# Patient Record
Sex: FEMALE | Race: WHITE | Employment: OTHER | URBAN - METROPOLITAN AREA
[De-identification: names, ages, dates, MRNs, and addresses within clinical notes are randomized per-mention and may not be internally consistent; named-entity substitution may affect disease eponyms.]

---

## 2018-01-11 ENCOUNTER — HOSPITAL ENCOUNTER (OUTPATIENT)
Dept: PHYSICAL THERAPY | Age: 63
Discharge: HOME | End: 2018-01-11
Payer: COMMERCIAL

## 2018-01-11 PROCEDURE — 97530 THERAPEUTIC ACTIVITIES: CPT

## 2018-01-11 PROCEDURE — 97162 PT EVAL MOD COMPLEX 30 MIN: CPT

## 2018-01-11 NOTE — PROGRESS NOTES
PT DAILY TREATMENT NOTE 3-16    Patient Name: Lou Lim  Date:2018  : 1955  [x]  Patient  Verified  Payor: BLUE CROSS / Plan: Ant Bills [id number] PPO / Product Type: PPO /    In time:11:15 am  Out time:12:07 pm   Total Treatment Time (min): 52  Visit #: 1 of 12    Treatment Area: Left shoulder pain [M25.512]  Left elbow pain [M25.522]    SUBJECTIVE  Pain Level (0-10 scale): 3  Any medication changes, allergies to medications, adverse drug reactions, diagnosis change, or new procedure performed?: [x] No    [] Yes (see summary sheet for update)  Subjective functional status/changes:   [] No changes reported    Hx Present Illness: C/C of Left shoulder and elbow pain   Onset in spring of 2017 - walking dog and dog got excited and pulled leash in left arm - per pt \"he pulled so hard he broke the leash. \"  Pt declined to go to MD - was careful with use  2017 slipped and fell on the left shoulder  Went to PCP and referred to orthopedic surgeon  MRI reveled per pt \"inflammation, a small tear in the cuff and some mild arthritis in the neck. \"  per pt \"he said I was in between stages he could do surgery but I could also try physical therapy. \"  Increase in pain and difficulty with lying on the left side, reaching over head, reaching (as with hugging), pushing (as with standing up and pushing an object), pulling, lifting, dressing, bathing  Pt reports that has gradually tried to increase AROM  Right hand dominant     Pain:  _9__/10 max       __2_/10 min     _3___/10 now    Location: indicates anterior elbow and upper arm and biceps     [] Sharp    [] Dull      [] Burning     []  Aching     [x] Throbbing      [] Tingling     [] Other:       [x]  Constant                   [] Intermittent        Previous treatment:   Cortisone injection 1/10/18    PMHX: PMHx/Surgical Hx:  please see past medical hx form     Social/Recreation/Work: Work Hx: retired  Living Situation: lives with , between [place] near [facility]: caring for farm, scrap booking, gardening     Patient Goal(s): \"No pain and get back to wear I was. \"    Barriers: []pain []financial []time []transportation []other  Motivation: high  Substance use: []Alcohol []Tobacco []other:   FABQ Score: []low [x]elevate  Cognition: A & O x 4  Other    OBJECTIVE    30 min [x]Eval                  []Re-Eval              With   [] TE   [x] TA- 22 min   [] neuro   [] other: Patient Education: [x] Review HEP    [] Progressed/Changed HEP based on:   [] positioning   [] body mechanics   [] transfers   [] heat/ice application    [x] other: pt education regarding exam findings, anatomy involved POC  Activity modifications for dressing and bathing      Other Objective/Functional Measures: Movement/gait:      Visual Inspection: Thoracic: flattened  Lumbar: increased  Shoulder/Scapula: noted tipping and tilting at right scapula    Palpation: TTP at left RC musculature - greatest in infraspinatus  Active trigger points in infraspinatus and supraspinatus   Mild decrease in GHJ mobility   Decreased rib mobility                              AROM/PROM Right Left   Shoulder Flex *   Abd WFL 86*   IR T8-7 Buttock  In scap plane: 63   ER T2 Occiput  In scap plane: 50               Strength Right Left   Shoulder Flex 4+ p! Abd 4+ p! Scaption 4+ p! IR 4+ 4+   ER 4+ p! Pronation 5 5   Supination  5 5            Special Tests Right Left   Full Can/empty Can - +   Neer - +   HK - -   Crossover - +               Other Right Left                                        Other Comments: Standing Forward Flexion distal 1/3 of tibia  Scapular Rhythm: dyskinesia bilaterally   Challenged with engaging serratus     Pain Level (0-10 scale) post treatment: 3    ASSESSMENT/Changes in Function:   Pt is a 60 y.o.  Right hand dominant female with C/C of Left shoulder pain, onset in Spring of 2017 following walking her dog and having dog break the leash while walking. Exacerbation in November with slip and fall landing on left shoulder. Signs/Symptoms consistent with RC pathology and secondary impingement. Objective Findings include decreased ROM, pain with MMT of left shoulder, active trigger points in RC musculature and scapular dyskinesia. Patient will continue to benefit from skilled PT services to modify and progress therapeutic interventions, address functional mobility deficits, address ROM deficits, address strength deficits, analyze and address soft tissue restrictions, analyze and cue movement patterns, analyze and modify body mechanics/ergonomics, assess and modify postural abnormalities and instruct in home and community integration to attain remaining goals. [x]  See Plan of Care  []  See progress note/recertification  []  See Discharge Summary         Progress towards goals / Updated goals:  Short Term Goals: STG- To be accomplished in 2 week(s):  1. Pt will be independent with HEP to encourage prophylaxis. Eval:dispensed  Current: NA    Long Term Goals: LTG- To be accomplished in 6 week(s):  1. Pt will able to dress and bathe without pain,including reaching behind back with Left UE. Eval:  AROM/PROM Right Left   IR T8-7 Buttock  In scap plane: 63   ER T2 Occiput  In scap plane: 50   Current: NA    2. Pt will be able to reach overhead without pain to increase tolerance to daily activities. Eval:  AROM/PROM Right Left   Shoulder Flex *   Abd WFL 86*   Current: NA    3. Pt be able to lift bag of groceries and gallon of milk without pain. Eval:pain with all MMT of left shoulder  Current: NA    4. Pt FOTO score will increase by 14 points to show improvement in function.   Eval:55  Current: will address at visit 5      PLAN  [x]  Upgrade activities as tolerated     []  Continue plan of care  []  Update interventions per flow sheet       []  Discharge due to:_  []  Other:_      Gonzalez Guido, PT, DPT 1/11/2018  11:17 AM    No future appointments.

## 2018-01-11 NOTE — PROGRESS NOTES
[facility] at the [address], [place], [address] [facility]  Phone: [phone]      Fax:  [phone]       Plan of Care/ Statement of Necessity for Physical Therapy Services      Patient name: Merly Lopez Start of Care: 2018   Referral source: Shaunna Reeder MD : 1955    Medical Diagnosis: Left shoulder pain [M25.512]  Left elbow pain [M25.522]   Onset Date:Spring 2017    Treatment Diagnosis: Left Shoulder Pain    Prior Hospitalization: see medical history Provider#: [id number]   Medications: Verified on Patient summary List    Comorbidities: BMI >30, HTN, Hx of Cancer   Prior Level of Function: Increase in pain and difficulty with lying on the left side, reaching over head, reaching (as with hugging), pushing (as with standing up and pushing an object), pulling, lifting, dressing, bathing      The Plan of Care and following information is based on the information from the initial evaluation. Assessment/ key information:   Pt is a 60 y.o. Right hand dominant female with C/C of Left shoulder pain, onset in Spring of 2017 following walking her dog and having dog break the leash while walking. Exacerbation in November with slip and fall landing on left shoulder. Signs/Symptoms consistent with RC pathology and secondary impingement. Objective Findings include decreased ROM, pain with MMT of left shoulder, active trigger points in RC musculature and scapular dyskinesia. Evaluation Complexity History HIGH Complexity :3+ comorbidities / personal factors will impact the outcome/ POC ; Examination HIGH Complexity : 4+ Standardized tests and measures addressing body structure, function, activity limitation and / or participation in recreation  ;Presentation MEDIUM Complexity : Evolving with changing characteristics  ; Clinical Decision Making MEDIUM Complexity : FOTO score of 26-74  Overall Complexity Rating: MEDIUM  Problem List: pain affecting function, decrease ROM, decrease strength, impaired gait/ balance, decrease ADL/ functional abilitiies, decrease activity tolerance and decrease flexibility/ joint mobility   Treatment Plan may include any combination of the following: Therapeutic exercise, Therapeutic activities, Neuromuscular re-education, Physical agent/modality, Gait/balance training and Manual therapy  Patient / Family readiness to learn indicated by: asking questions, trying to perform skills and interest  Persons(s) to be included in education: patient (P)  Barriers to Learning/Limitations: None  Patient Goal (s): No pain and get back to wear I was  Patient Self Reported Health Status: excellent  Rehabilitation Potential: good    Short Term Goals: STG- To be accomplished in 2 week(s):  1. Pt will be independent with HEP to encourage prophylaxis. Eval:dispensed  Current: NA     Long Term Goals: LTG- To be accomplished in 6 week(s):  1. Pt will able to dress and bathe without pain,including reaching behind back with Left UE. Eval:  AROM/PROM Right Left   IR T8-7 Buttock  In scap plane: 63   ER T2 Occiput  In scap plane: 50   Current: NA     2.  Pt will be able to reach overhead without pain to increase tolerance to daily activities. Eval:  AROM/PROM Right Left   Shoulder Flex *   Abd WFL 86*   Current: NA     3.  Pt be able to lift bag of groceries and gallon of milk without pain. Eval:pain with all MMT of left shoulder  Current: NA     4. Pt FOTO score will increase by 14 points to show improvement in function. Eval:55  Current: will address at visit 5     Frequency / Duration: Patient to be seen 2 times per week for 6 weeks.     Patient/ Caregiver education and instruction: Diagnosis, prognosis, self care, activity modification and exercises   [x]  Plan of care has been reviewed with ASHLY Gregory PT, DPT 1/11/2018 1:01 PM  _____________________________________________________________________  I certify that the above Therapy Services are being furnished while the patient is under my care. I agree with the treatment plan and certify that this therapy is necessary.     Physician's Signature:____________________  Date:__________Time:______    Please sign and return to [facility] at the [address], [facility], [address] [facility]       Phone: [phone]      Fax:  [phone]

## 2018-01-16 ENCOUNTER — HOSPITAL ENCOUNTER (OUTPATIENT)
Dept: PHYSICAL THERAPY | Age: 63
Discharge: HOME | End: 2018-01-16
Payer: COMMERCIAL

## 2018-01-16 PROCEDURE — 97110 THERAPEUTIC EXERCISES: CPT

## 2018-01-16 PROCEDURE — 97140 MANUAL THERAPY 1/> REGIONS: CPT

## 2018-01-16 NOTE — PROGRESS NOTES
PT DAILY TREATMENT NOTE     Patient Name: Enrico Granger  Date:2018  : 1955  [x]  Patient  Verified  Payor: BLUE CROSS / Plan: Ant Bills [id number] PPO / Product Type: PPO /    In time:10:27 am  Out time:11:39 am  Total Treatment Time (min): 68 (waiting on PT)  Visit #: 2 of 12    Treatment Area: Left shoulder pain [M25.512]  Left elbow pain [M25.522]    SUBJECTIVE  Pain Level (0-10 scale): 0-3  Any medication changes, allergies to medications, adverse drug reactions, diagnosis change, or new procedure performed?: [x] No    [] Yes (see summary sheet for update)  Subjective functional status/changes:   [] No changes reported  \"It is not that bad, it is getting there. \"    OBJECTIVE    Modality rationale:    Min Type Additional Details    [] Estim:  []Unatt       []IFC  []Premod                        []Other:  []w/ice   []w/heat  Position:  Location:    [] Estim: []Att    []TENS instruct  []NMES                    []Other:  []w/US   []w/ice   []w/heat  Position:  Location:    []  Traction: [] Cervical       []Lumbar                       [] Prone          []Supine                       []Intermittent   []Continuous Lbs:  [] before manual  [] after manual    []  Ultrasound: []Continuous   [] Pulsed                           []1MHz   []3MHz W/cm2:  Location:    []  Iontophoresis with dexamethasone         Location: [] Take home patch   [] In clinic    []  Ice     []  heat  []  Ice massage  []  Laser   []  Anodyne Position:  Location:    []  Laser with stim  []  Other:  Position:  Location:    []  Vasopneumatic Device Pressure:       [] lo [] med [] hi   Temperature: [] lo [] med [] hi   [] Skin assessment post-treatment:  []intact []redness- no adverse reaction    []redness  adverse reaction:     53 min Therapeutic Exercise:  [x] See flow sheet :   Rationale: increase ROM, increase strength, improve coordination and increase proprioception to improve the patients ability to perform daily activities with decreased pain and symptom levels    15 min Manual Therapy:  Inferior and posterior GHJ mobs grade III  PROM with Distraction into flexion and abduction  Contract relax stretching of ER  STM to biceps and infraspinatus    Rationale: decrease pain, increase ROM, increase tissue extensibility, decrease trigger points and increase postural awareness to perform daily activities with decreased pain and symptom levels          With   [] TE   [] TA   [] neuro   [] other: Patient Education: [x] Review HEP    [] Progressed/Changed HEP based on:   [] positioning   [] body mechanics   [] transfers   [] heat/ice application    [] other:      Other Objective/Functional Measures:   TTP and active trigger points in left infraspinatus. Pain with ER     Pain Level (0-10 scale) post treatment: 2    ASSESSMENT/Changes in Function:   Pt reported feeling a \"catch\" with supine flexion, increased pain with lowering. Advised pt to relax left UE expect increased muscle guarding. Patient will continue to benefit from skilled PT services to modify and progress therapeutic interventions, address functional mobility deficits, address ROM deficits, address strength deficits, analyze and address soft tissue restrictions, analyze and cue movement patterns, analyze and modify body mechanics/ergonomics, assess and modify postural abnormalities and instruct in home and community integration to attain remaining goals. []  See Plan of Care  []  See progress note/recertification  []  See Discharge Summary         Progress towards goals / Updated goals:  Short Term Goals: STG- To be accomplished in 2 week(s):  1.  Pt will be independent with HEP to encourage prophylaxis. Eval:dispensed  Current: reports compliance      Long Term Goals: LTG- To be accomplished in 6 week(s):  1.  Pt will able to dress and bathe without pain,including reaching behind back with Left UE.   Eval:  AROM/PROM Right Left   IR T8-7 Buttock  In scap plane: 63   ER T2 Occiput  In scap plane: 50   Current: NA      2.  Pt will be able to reach overhead without pain to increase tolerance to daily activities. Eval:  AROM/PROM Right Left   Shoulder Flex *   Abd WFL 86*   Current: NA      3.  Pt be able to lift bag of groceries and gallon of milk without pain. Eval:pain with all MMT of left shoulder  Current: NA      4.  Pt FOTO score will increase by 14 points to show improvement in function.   Eval:55  Current: will address at visit 5      PLAN  [x]  Upgrade activities as tolerated     []  Continue plan of care  []  Update interventions per flow sheet       []  Discharge due to:_  []  Other:_      Dearl Milks, PT, DPT 1/16/2018  10:33 AM    Future Appointments  Date Time Provider [place]   1/18/2018 2:00 PM Dearl Milks, PT, DPT [facility]   1/23/2018 10:00 AM Dearl Milks, PT, DPT [facility]   1/25/2018 10:00 AM Dearl Milks, PT, DPT [facility]   1/30/2018 3:00 PM Franklin Owens, PTA [facility]   2/1/2018 10:30 AM Dearl Milks, PT, DPT [facility]   2/6/2018 10:00 AM Dearl Milks, PT, DPT [facility]   2/8/2018 10:00 AM Dearl Milks, PT, DPT [facility]   2/13/2018 10:00 AM Dearl Milks, PT, DPT [facility]   2/15/2018 10:00 AM Dearl Milks, PT, DPT [facility]

## 2018-01-18 ENCOUNTER — APPOINTMENT (OUTPATIENT)
Dept: PHYSICAL THERAPY | Age: 63
End: 2018-01-18
Payer: COMMERCIAL

## 2018-01-23 ENCOUNTER — HOSPITAL ENCOUNTER (OUTPATIENT)
Dept: PHYSICAL THERAPY | Age: 63
Discharge: HOME | End: 2018-01-23
Payer: COMMERCIAL

## 2018-01-23 PROCEDURE — 97140 MANUAL THERAPY 1/> REGIONS: CPT

## 2018-01-23 PROCEDURE — 97110 THERAPEUTIC EXERCISES: CPT

## 2018-01-23 NOTE — PROGRESS NOTES
PT DAILY TREATMENT NOTE     Patient Name: Luh Rios  Date:2018  : 1955  [x]  Patient  Verified  Payor: BLUE GARCIA / Plan: Ant Bills [id number] PPO / Product Type: PPO /    In time:10:00 am  Out time:11:15 am  Total Treatment Time (min): 75  Visit #: 3 of 12    Treatment Area: Left shoulder pain [M25.512]  Left elbow pain [M25.522]    SUBJECTIVE  Pain Level (0-10 scale): 0  Any medication changes, allergies to medications, adverse drug reactions, diagnosis change, or new procedure performed?: [x] No    [] Yes (see summary sheet for update)  Subjective functional status/changes:   [] No changes reported  \"A lot better. \"  Pt reported that was able to reach up and hug daughter without even thinking about it without pain.     OBJECTIVE    Modality rationale:    Min Type Additional Details    [] Estim:  []Unatt       []IFC  []Premod                        []Other:  []w/ice   []w/heat  Position:  Location:    [] Estim: []Att    []TENS instruct  []NMES                    []Other:  []w/US   []w/ice   []w/heat  Position:  Location:    []  Traction: [] Cervical       []Lumbar                       [] Prone          []Supine                       []Intermittent   []Continuous Lbs:  [] before manual  [] after manual    []  Ultrasound: []Continuous   [] Pulsed                           []1MHz   []3MHz W/cm2:  Location:    []  Iontophoresis with dexamethasone         Location: [] Take home patch   [] In clinic    []  Ice     []  heat  []  Ice massage  []  Laser   []  Anodyne Position:  Location:    []  Laser with stim  []  Other:  Position:  Location:    []  Vasopneumatic Device Pressure:       [] lo [] med [] hi   Temperature: [] lo [] med [] hi   [] Skin assessment post-treatment:  []intact []redness- no adverse reaction    []redness  adverse reaction:     55 min Therapeutic Exercise:  [x] See flow sheet :   Rationale: increase ROM, increase strength, improve coordination and increase proprioception to improve the patients ability to perform daily activities with decreased pain and symptom levels     20 min Manual Therapy:  Inferior and posterior GHJ mobs grade III  PROM with Distraction into flexion and abduction  Contract relax stretching of ER  STM to biceps and infraspinatus    Rationale: decrease pain, increase ROM, increase tissue extensibility, decrease trigger points and increase postural awareness to perform daily activities with decreased pain and symptom levels          With   [] TE   [] TA   [] neuro   [] other: Patient Education: [x] Review HEP    [] Progressed/Changed HEP based on:   [] positioning   [] body mechanics   [] transfers   [] heat/ice application    [] other:      Other Objective/Functional Measures:   Continued trigger points in infraspinatus      Pain Level (0-10 scale) post treatment: 0    ASSESSMENT/Changes in Function:   Pt reported noticing increased AROM with decreased pain. Challenged with serratus recruitment. Patient will continue to benefit from skilled PT services to modify and progress therapeutic interventions, address functional mobility deficits, address ROM deficits, address strength deficits, analyze and address soft tissue restrictions, analyze and cue movement patterns, analyze and modify body mechanics/ergonomics, assess and modify postural abnormalities and instruct in home and community integration to attain remaining goals. []  See Plan of Care  []  See progress note/recertification  []  See Discharge Summary         Progress towards goals / Updated goals:  Short Term Goals: STG- To be accomplished in 2 week(s):  1.  Pt will be independent with HEP to encourage prophylaxis. Eval:dispensed  Current: MET, reports compliance      Long Term Goals: LTG- To be accomplished in 6 week(s):  1.  Pt will able to dress and bathe without pain,including reaching behind back with Left UE.   Eval:  AROM/PROM Right Left   IR T8-7 Buttock  In scap plane: 63   ER T2 Occiput  In scap plane: 50   Current: NA      2.  Pt will be able to reach overhead without pain to increase tolerance to daily activities. Eval:  AROM/PROM Right Left   Shoulder Flex *   Abd WFL 86*   Current: NA      3.  Pt be able to lift bag of groceries and gallon of milk without pain. Eval:pain with all MMT of left shoulder  Current: NA      4.  Pt FOTO score will increase by 14 points to show improvement in function.   Eval:55  Current: will address at visit 5      PLAN  [x]  Upgrade activities as tolerated     []  Continue plan of care  []  Update interventions per flow sheet       []  Discharge due to:_  []  Other:_      Gary Dong, PT, DPT 1/23/2018  10:08 AM    Future Appointments  Date Time Provider [place] Fer[place]   1/25/2018 10:00 AM Gary Dong PT, DPT [facility]   1/30/2018 3:00 PM Elva Kauffman PTA [facility]   2/1/2018 10:30 AM Gary Dong PT, DPT [facility]   2/6/2018 10:00 AM Gary Dong PT, DPT [facility]   2/8/2018 10:00 AM Gary Dong PT, DPT [facility] [place] [facility]   2/13/2018 10:00 AM Gary Dong PT, DPT [facility]   2/15/2018 10:00 AM Gary Dong PT, DPT [facility]

## 2018-01-25 ENCOUNTER — HOSPITAL ENCOUNTER (OUTPATIENT)
Dept: PHYSICAL THERAPY | Age: 63
Discharge: HOME | End: 2018-01-25
Payer: COMMERCIAL

## 2018-01-25 PROCEDURE — 97110 THERAPEUTIC EXERCISES: CPT

## 2018-01-25 PROCEDURE — 97140 MANUAL THERAPY 1/> REGIONS: CPT

## 2018-01-25 NOTE — PROGRESS NOTES
PT DAILY TREATMENT NOTE     Patient Name: Aniya Mensah  Date:2018  : 1955  [x]  Patient  Verified  Payor: BLUE CROSS / Plan: [place] [facility] PPO / Product Type: PPO /    In time:10:00 am  Out time:11:02 am  Total Treatment Time (min): 62  Visit #: 4 of 12    Treatment Area: Left shoulder pain [M25.512]  Left elbow pain [M25.522]    SUBJECTIVE  Pain Level (0-10 scale): 0  Any medication changes, allergies to medications, adverse drug reactions, diagnosis change, or new procedure performed?: [x] No    [] Yes (see summary sheet for update)  Subjective functional status/changes:   [] No changes reported  \"Actually very good. I can see improvements. \"    OBJECTIVE    Modality rationale:    Min Type Additional Details    [] Estim:  []Unatt       []IFC  []Premod                        []Other:  []w/ice   []w/heat  Position:  Location:    [] Estim: []Att    []TENS instruct  []NMES                    []Other:  []w/US   []w/ice   []w/heat  Position:  Location:    []  Traction: [] Cervical       []Lumbar                       [] Prone          []Supine                       []Intermittent   []Continuous Lbs:  [] before manual  [] after manual    []  Ultrasound: []Continuous   [] Pulsed                           []1MHz   []3MHz W/cm2:  Location:    []  Iontophoresis with dexamethasone         Location: [] Take home patch   [] In clinic    []  Ice     []  heat  []  Ice massage  []  Laser   []  Anodyne Position:  Location:    []  Laser with stim  []  Other:  Position:  Location:    []  Vasopneumatic Device Pressure:       [] lo [] med [] hi   Temperature: [] lo [] med [] hi   [] Skin assessment post-treatment:  []intact []redness- no adverse reaction    []redness  adverse reaction:     50 min Therapeutic Exercise:  [x] See flow sheet :   Rationale: increase ROM, increase strength, improve coordination and increase proprioception to improve the patients ability to perform daily activities with decreased pain and symptom levels      12 min Manual Therapy:  Inferior and posterior GHJ mobs grade III  PROM with Distraction into flexion and abduction  Contract relax stretching of ER  STM to biceps and infraspinatus    Rationale: decrease pain, increase ROM, increase tissue extensibility, decrease trigger points and increase postural awareness to perform daily activities with decreased pain and symptom levels          With   [x] TE   [] TA   [] neuro   [] other: Patient Education: [x] Review HEP    [] Progressed/Changed HEP based on:   [] positioning   [] body mechanics   [] transfers   [] heat/ice application    [] other:      Other Objective/Functional Measures:   Functional IR L4-5     Pain Level (0-10 scale) post treatment: 0    ASSESSMENT/Changes in Function:   Pt appears to be responding well to PT. Reports that put orange juice in fridge with left arm and didn't think about it. Patient will continue to benefit from skilled PT services to modify and progress therapeutic interventions, address functional mobility deficits, address ROM deficits, address strength deficits, analyze and address soft tissue restrictions, analyze and cue movement patterns, analyze and modify body mechanics/ergonomics, assess and modify postural abnormalities and instruct in home and community integration to attain remaining goals. []  See Plan of Care  []  See progress note/recertification  []  See Discharge Summary         Progress towards goals / Updated goals:  Short Term Goals: STG- To be accomplished in 2 week(s):  1.  Pt will be independent with HEP to encourage prophylaxis. Eval:dispensed  Current: MET, reports compliance      Long Term Goals: LTG- To be accomplished in 6 week(s):  1.  Pt will able to dress and bathe without pain,including reaching behind back with Left UE. Eval:  AROM/PROM Right Left   IR T8-7 Buttock  In scap plane: 63   ER T2 Occiput  In scap plane: 50   Current: Progressing, IR L4-5      2.  Pt will be able to reach overhead without pain to increase tolerance to daily activities. Eval:  AROM/PROM Right Left   Shoulder Flex *   Abd WFL 86*   Current: NA      3.  Pt be able to lift bag of groceries and gallon of milk without pain. Eval:pain with all MMT of left shoulder  Current: NA      4.  Pt FOTO score will increase by 14 points to show improvement in function.   Eval:55  Current: will address at visit 5      PLAN  [x]  Upgrade activities as tolerated     [x]  Continue plan of care  []  Update interventions per flow sheet       []  Discharge due to:_  []  Other:_      Antonietta Gould PT, DPT 1/25/2018  10:15 AM    Future Appointments  Date Time Provider Sandra Schmidti   1/30/2018 3:00 PM Alonzo Valdez PTA [facility]   2/1/2018 10:30 AM Antonietta Gould PT, DPT [facility]   2/6/2018 10:00 AM Antonietta Gould PT, DPT [facility]   2/8/2018 10:00 AM Antonietta Gould PT, DPT [facility]   2/13/2018 10:00 AM Antonietta Gould PT, DPT [facility]   2/15/2018 10:00 AM Antonietta Gould PT, DPT [facility]

## 2018-01-30 ENCOUNTER — HOSPITAL ENCOUNTER (OUTPATIENT)
Dept: PHYSICAL THERAPY | Age: 63
Discharge: HOME | End: 2018-01-30
Payer: COMMERCIAL

## 2018-01-30 PROCEDURE — 97110 THERAPEUTIC EXERCISES: CPT

## 2018-01-30 PROCEDURE — 97140 MANUAL THERAPY 1/> REGIONS: CPT

## 2018-01-30 NOTE — PROGRESS NOTES
PT DAILY TREATMENT NOTE     Patient Name: Tatianna Herrera  Date:2018  : 1955  [x]  Patient  Verified  Payor: BLUE CROSS / Plan: [facility] PPO / Product Type: PPO /    In time:1500  Out time:1602  Total Treatment Time (min): 62  Visit #: 5 of 12    Treatment Area: Left shoulder pain [M25.512]  Left elbow pain [M25.522]    SUBJECTIVE  Pain Level (0-10 scale): 0/10  Any medication changes, allergies to medications, adverse drug reactions, diagnosis change, or new procedure performed?: [x] No    [] Yes (see summary sheet for update)  Subjective functional status/changes:   [] No changes reported  I carried laundry upstairs without even thinking about it.     OBJECTIVE    Modality rationale:    Min Type Additional Details    [] Estim:  []Unatt       []IFC  []Premod                        []Other:  []w/ice   []w/heat  Position:  Location:    [] Estim: []Att    []TENS instruct  []NMES                    []Other:  []w/US   []w/ice   []w/heat  Position:  Location:    []  Traction: [] Cervical       []Lumbar                       [] Prone          []Supine                       []Intermittent   []Continuous Lbs:  [] before manual  [] after manual    []  Ultrasound: []Continuous   [] Pulsed                           []1MHz   []3MHz W/cm2:  Location:    []  Iontophoresis with dexamethasone         Location: [] Take home patch   [] In clinic    []  Ice     []  heat  []  Ice massage  []  Laser   []  Anodyne Position:  Location:    []  Laser with stim  []  Other:  Position:  Location:    []  Vasopneumatic Device Pressure:       [] lo [] med [] hi   Temperature: [] lo [] med [] hi   [] Skin assessment post-treatment:  []intact []redness- no adverse reaction    []redness  adverse reaction:      min []Eval                  []Re-Eval       52 min Therapeutic Exercise:  [x] See flow sheet :added [place] without weight   Rationale: increase ROM, increase strength and improve coordination to improve the patients ability to normalize function      10 min Manual Therapy: STM left infraspinatus in prone    Rationale: increase ROM, increase tissue extensibility and decrease trigger points to normalize ADL's     min Gait Training:  ___ feet with ___ device on level surfaces with ___ level of assist   Rationale: With   [x] TE   [] TA   [] neuro   [] other: Patient Education: [x] Review HEP  Issued orange tubing for sh PRE's  [] Progressed/Changed HEP based on:   [] positioning   [] body mechanics   [] transfers   [] heat/ice application    [] other:      Other Objective/Functional Measures: FOTO: 88/100     Pain Level (0-10 scale) post treatment: 0/10    ASSESSMENT/Changes in Function: Pt reporting improvements with ADL's with left sh pain resolving. Patient will continue to benefit from skilled PT services to modify and progress therapeutic interventions, address ROM deficits, address strength deficits, analyze and address soft tissue restrictions, analyze and cue movement patterns, analyze and modify body mechanics/ergonomics and instruct in home and community integration to attain remaining goals. [x]  See Plan of Care  []  See progress note/recertification  []  See Discharge Summary         Progress towards goals / Updated goals:  Short Term Goals: STG- To be accomplished in 2 week(s):  1.  Pt will be independent with HEP to encourage prophylaxis. Eval:dispensed  Current: MET, reports compliance. Issued tubing today for sh PRE's      Long Term Goals: LTG- To be accomplished in 6 week(s):  1.  Pt will able to dress and bathe without pain,including reaching behind back with Left UE. Eval:  AROM/PROM Right Left   IR T8-7 Buttock  In scap plane: 63   ER T2 Occiput  In scap plane: 50   Current: Progressing, IR L4-5      2.  Pt will be able to reach overhead without pain to increase tolerance to daily activities.   Eval:  AROM/PROM Right Left   Shoulder Flex *   Abd WFL 86*   Current: NA      3.  Pt be able to lift bag of groceries and gallon of milk without pain. Eval:pain with all MMT of left shoulder  Current: carrying laundry and groceries without pain      4.  Pt FOTO score will increase by 14 points to show improvement in function. Eval:55  Current: 88/100         PLAN  []  Upgrade activities as tolerated     [x]  Continue plan of care  []  Update interventions per flow sheet       []  Discharge due to:_  [x]  Other:_MD follow up next Wednesday.       Evaristo Macllister PTA 1/30/2018  3:06 PM    Future Appointments  Date Time Provider Sandra Ferrell   2/1/2018 10:30 AM Haritha Alicea PT, DPT [facility]   2/6/2018 10:00 AM Haritha Alicea PT, DPT [facility]   2/8/2018 10:00 AM Haritha Alicea PT, DPT [facility]   2/13/2018 10:00 AM Haritha Alicea PT, DPT [facility]   2/15/2018 10:00 AM Haritha Alicea PT, DPT [facility]

## 2018-02-01 ENCOUNTER — HOSPITAL ENCOUNTER (OUTPATIENT)
Dept: PHYSICAL THERAPY | Age: 63
Discharge: HOME | End: 2018-02-01
Payer: COMMERCIAL

## 2018-02-01 PROCEDURE — 97140 MANUAL THERAPY 1/> REGIONS: CPT

## 2018-02-01 PROCEDURE — 97110 THERAPEUTIC EXERCISES: CPT

## 2018-02-01 NOTE — PROGRESS NOTES
PT DAILY TREATMENT NOTE     Patient Name: Stephen Sender  Date:2018  : 1955  [x]  Patient  Verified  Payor: BLUE CROSS / Plan: [place] [facility] PPO / Product Type: PPO /    In time:10:34 am  Out time:11:40 am  Total Treatment Time (min): 77  Visit #: 6 of 12    Treatment Area: Left shoulder pain [M25.512]  Left elbow pain [M25.522]    SUBJECTIVE  Pain Level (0-10 scale): 0  Any medication changes, allergies to medications, adverse drug reactions, diagnosis change, or new procedure performed?: [x] No    [] Yes (see summary sheet for update)  Subjective functional status/changes:   [] No changes reported  \"Really good. The farmer's almanac is predicting snow around the 6th. \"    OBJECTIVE    Modality rationale:    Min Type Additional Details    [] Estim:  []Unatt       []IFC  []Premod                        []Other:  []w/ice   []w/heat  Position:  Location:    [] Estim: []Att    []TENS instruct  []NMES                    []Other:  []w/US   []w/ice   []w/heat  Position:  Location:    []  Traction: [] Cervical       []Lumbar                       [] Prone          []Supine                       []Intermittent   []Continuous Lbs:  [] before manual  [] after manual    []  Ultrasound: []Continuous   [] Pulsed                           []1MHz   []3MHz W/cm2:  Location:    []  Iontophoresis with dexamethasone         Location: [] Take home patch   [] In clinic    []  Ice     []  heat  []  Ice massage  []  Laser   []  Anodyne Position:  Location:    []  Laser with stim  []  Other:  Position:  Location:    []  Vasopneumatic Device Pressure:       [] lo [] med [] hi   Temperature: [] lo [] med [] hi   [] Skin assessment post-treatment:  []intact []redness- no adverse reaction    []redness  adverse reaction:     54 min Therapeutic Exercise:  [x] See flow sheet :   Rationale: increase ROM, increase strength, improve coordination and increase proprioception to improve the patients ability to perform daily activities with decreased pain and symptom levels      12 min Manual Therapy:  Inferior and posterior GHJ mobs grade III  PROM with Distraction into flexion and abduction  Contract relax stretching of ER  STM to biceps and infraspinatus    Rationale: decrease pain, increase ROM, increase tissue extensibility, decrease trigger points and increase postural awareness to perform daily activities with decreased pain and symptom levels          With   [] TE   [] TA   [] neuro   [] other: Patient Education: [x] Review HEP    [] Progressed/Changed HEP based on:   [] positioning   [] body mechanics   [] transfers   [] heat/ice application    [] other:      Other Objective/Functional Measures:   AROM Flexion in supine: 152     Pain Level (0-10 scale) post treatment: 0    ASSESSMENT/Changes in Function:   Pt reports compliance with HEP. Stated has noticed increase in function and minimal to no pain. Stated that  has noticed a difference. Patient will continue to benefit from skilled PT services to modify and progress therapeutic interventions, address functional mobility deficits, address ROM deficits, address strength deficits, analyze and address soft tissue restrictions, analyze and cue movement patterns, analyze and modify body mechanics/ergonomics, assess and modify postural abnormalities and instruct in home and community integration to attain remaining goals. []  See Plan of Care  []  See progress note/recertification  []  See Discharge Summary         Progress towards goals / Updated goals:  Long Term Goals: LTG- To be accomplished in 6 week(s):  1.  Pt will able to dress and bathe without pain,including reaching behind back with Left UE. Eval:  AROM/PROM Right Left   IR T8-7 Buttock  In scap plane: 63   ER T2 Occiput  In scap plane: 50   Current: Progressing, IR L4-5      2.  Pt will be able to reach overhead without pain to increase tolerance to daily activities.   Eval:  AROM/PROM Right Left Shoulder Flex *   Abd WFL 86*   Current: Progressing: Flexion 152*      3.  Pt be able to lift bag of groceries and gallon of milk without pain. Eval:pain with all MMT of left shoulder  Current: carrying laundry and groceries without pain      4.  Pt FOTO score will increase by 14 points to show improvement in function.   Eval:55  Current:MET 88/100      PLAN  [x]  Upgrade activities as tolerated     []  Continue plan of care  []  Update interventions per flow sheet       []  Discharge due to:_  []  Other:_      Simon Edwards PT, DPT 2/1/2018  10:32 AM    Future Appointments  Date Time Provider Sandra Ferrell   2/6/2018 10:00 AM Angela Livingston PTA [facility]   2/8/2018 10:00 AM Simon Edwards PT, DPT [facility]   2/13/2018 10:00 AM Simon Edwards PT, DPT [facility]   2/15/2018 10:00 AM Simon Edwards PT, DPT [facility]

## 2018-02-06 ENCOUNTER — HOSPITAL ENCOUNTER (OUTPATIENT)
Dept: PHYSICAL THERAPY | Age: 63
Discharge: HOME | End: 2018-02-06
Payer: COMMERCIAL

## 2018-02-06 PROCEDURE — 97140 MANUAL THERAPY 1/> REGIONS: CPT

## 2018-02-06 PROCEDURE — 97110 THERAPEUTIC EXERCISES: CPT

## 2018-02-06 NOTE — PROGRESS NOTES
PT DAILY TREATMENT NOTE     Patient Name: Yris Emerson  Date:2018  : 1955  [x]  Patient  Verified  Payor: BLUE CROSS / Plan: [facility] PPO / Product Type: PPO /    In JBHJ:[id number]  Out time:1057  Total Treatment Time (min): 58  Visit #: 7 of 12    Treatment Area: Left shoulder pain [M25.512]  Left elbow pain [M25.522]    SUBJECTIVE  Pain Level (0-10 scale): 0/10  Any medication changes, allergies to medications, adverse drug reactions, diagnosis change, or new procedure performed?: [x] No    [] Yes (see summary sheet for update)  Subjective functional status/changes:   [] No changes reported  I go to the MD tomorrow. I am sleeping on that shoulder now too.     OBJECTIVE    Modality rationale:    Min Type Additional Details    [] Estim:  []Unatt       []IFC  []Premod                        []Other:  []w/ice   []w/heat  Position:  Location:    [] Estim: []Att    []TENS instruct  []NMES                    []Other:  []w/US   []w/ice   []w/heat  Position:  Location:    []  Traction: [] Cervical       []Lumbar                       [] Prone          []Supine                       []Intermittent   []Continuous Lbs:  [] before manual  [] after manual    []  Ultrasound: []Continuous   [] Pulsed                           []1MHz   []3MHz W/cm2:  Location:    []  Iontophoresis with dexamethasone         Location: [] Take home patch   [] In clinic    []  Ice     []  heat  []  Ice massage  []  Laser   []  Anodyne Position:  Location:    []  Laser with stim  []  Other:  Position:  Location:    []  Vasopneumatic Device Pressure:       [] lo [] med [] hi   Temperature: [] lo [] med [] hi   [] Skin assessment post-treatment:  []intact []redness- no adverse reaction    []redness  adverse reaction:      min []Eval                  []Re-Eval       52 min Therapeutic Exercise:  [x] See flow sheet :   Rationale: increase ROM, increase strength and improve coordination to improve the patients ability to normalize ADL's        10 min Manual Therapy:  STM left infrasopinatus, contract/relax to promote ER   Rationale: increase ROM and increase tissue extensibility to normalize ADL's     min Gait Training:  ___ feet with ___ device on level surfaces with ___ level of assist   Rationale: With   [] TE   [] TA   [] neuro   [] other: Patient Education: [x] Review HEP    [] Progressed/Changed HEP based on:   [] positioning   [] body mechanics   [] transfers   [] heat/ice application    [] other:      Other Objective/Functional Measures:      Pain Level (0-10 scale) post treatment: 0/10    ASSESSMENT/Changes in Function: Pt now reporting improved sleep on left shoulder without interruption with all ADL's normalizing. Patient will continue to benefit from skilled PT services to modify and progress therapeutic interventions, address ROM deficits, address strength deficits, analyze and address soft tissue restrictions, analyze and cue movement patterns, analyze and modify body mechanics/ergonomics and instruct in home and community integration to attain remaining goals. [x]  See Plan of Care  []  See progress note/recertification  []  See Discharge Summary         Progress towards goals / Updated goals:  Long Term Goals: LTG- To be accomplished in 6 week(s):  1.  Pt will able to dress and bathe without pain,including reaching behind back with Left UE. Eval:  AROM/PROM Right Left   IR T8-7 Buttock  In scap plane: 63   ER T2 Occiput  In scap plane: 50   Current: Progressing, IR L4-5      2.  Pt will be able to reach overhead without pain to increase tolerance to daily activities. Eval:  AROM/PROM Right Left   Shoulder Flex *   Abd WFL 86*   Current: Progressing: Flexion 152*      3.  Pt be able to lift bag of groceries and gallon of milk without pain.   Eval:pain with all MMT of left shoulder  Current: carrying laundry and groceries without pain      4.  Pt FOTO score will increase by 14 points to show improvement in function. Eval:55  Current:MET 88/100         PLAN  []  Upgrade activities as tolerated     []  Continue plan of care  []  Update interventions per flow sheet       []  Discharge due to:_  [x]  Other:MD tomorrow. Prep for discharge in 2 weeks.       Neena Rayo, ASHLY 2/6/2018  10:15 AM    Future Appointments  Date Time Provider Sandra Ferrell   2/8/2018 10:00 AM John Melendez PT, DPT [facility]   2/13/2018 10:00 AM John Melendez PT, DPT [facility]   2/15/2018 10:00 AM John Melendez PT, DPT [facility]

## 2018-02-08 ENCOUNTER — HOSPITAL ENCOUNTER (OUTPATIENT)
Dept: PHYSICAL THERAPY | Age: 63
Discharge: HOME | End: 2018-02-08
Payer: COMMERCIAL

## 2018-02-08 PROCEDURE — 97140 MANUAL THERAPY 1/> REGIONS: CPT

## 2018-02-08 PROCEDURE — 97110 THERAPEUTIC EXERCISES: CPT

## 2018-02-08 NOTE — PROGRESS NOTES
PT DAILY TREATMENT NOTE     Patient Name: Alisa Kang  Date:2018  : 1955  [x]  Patient  Verified  Payor: BLUE CROSS / Plan: [facility] PPO / Product Type: PPO /    In time:1000  Out time:1058  Total Treatment Time (min): 58  Visit #: 8 of 12    Treatment Area: Left shoulder pain [M25.512]  Left elbow pain [M25.522]    SUBJECTIVE  Pain Level (0-10 scale): 0/10  Any medication changes, allergies to medications, adverse drug reactions, diagnosis change, or new procedure performed?: [x] No    [] Yes (see summary sheet for update)  Subjective functional status/changes:   [] No changes reported  I am doing great! The MD cleared me.     OBJECTIVE    Modality rationale:    Min Type Additional Details    [] Estim:  []Unatt       []IFC  []Premod                        []Other:  []w/ice   []w/heat  Position:  Location:    [] Estim: []Att    []TENS instruct  []NMES                    []Other:  []w/US   []w/ice   []w/heat  Position:  Location:    []  Traction: [] Cervical       []Lumbar                       [] Prone          []Supine                       []Intermittent   []Continuous Lbs:  [] before manual  [] after manual    []  Ultrasound: []Continuous   [] Pulsed                           []1MHz   []3MHz W/cm2:  Location:    []  Iontophoresis with dexamethasone         Location: [] Take home patch   [] In clinic    []  Ice     []  heat  []  Ice massage  []  Laser   []  Anodyne Position:  Location:    []  Laser with stim  []  Other:  Position:  Location:    []  Vasopneumatic Device Pressure:       [] lo [] med [] hi   Temperature: [] lo [] med [] hi   [] Skin assessment post-treatment:  []intact []redness- no adverse reaction    []redness  adverse reaction:      min []Eval                  []Re-Eval       46 min Therapeutic Exercise:  [x] See flow sheet :rows with ER, added wall star B UE's   Rationale: increase ROM, increase strength and improve coordination to improve the patients ability to improve function        12 min Manual Therapy:  STM left infrasopinatus, contract/relax to promote ER, DTM/TPM left UT/lev scap   Rationale: decrease pain, increase ROM, increase tissue extensibility and decrease trigger points to improve ADL's     min Gait Training:  ___ feet with ___ device on level surfaces with ___ level of assist   Rationale: With   [] TE   [] TA   [] neuro   [] other: Patient Education: [x] Review HEP    [] Progressed/Changed HEP based on:   [] positioning   [] body mechanics   [] transfers   [] heat/ice application    [] other:      Other Objective/Functional Measures:      Pain Level (0-10 scale) post treatment: 0/10    ASSESSMENT/Changes in Function: Pt is progressing left UE strength and stability with pain resolved at this time. Patient will continue to benefit from skilled PT services to modify and progress therapeutic interventions, address ROM deficits, address strength deficits, analyze and address soft tissue restrictions, analyze and cue movement patterns and instruct in home and community integration to attain remaining goals. [x]  See Plan of Care  []  See progress note/recertification  []  See Discharge Summary         Progress towards goals / Updated goals:Long Term Goals: LTG- To be accomplished in 6 week(s):  1.  Pt will able to dress and bathe without pain,including reaching behind back with Left UE. Eval:  AROM/PROM Right Left   IR T8-7 Buttock  In scap plane: 63   ER T2 Occiput  In scap plane: 50   Current: Progressing, IR L4-5      2.  Pt will be able to reach overhead without pain to increase tolerance to daily activities. Eval:  AROM/PROM Right Left   Shoulder Flex *   Abd WFL 86*   Current: Progressing: Flexion 152*      3.  Pt be able to lift bag of groceries and gallon of milk without pain.   Eval:pain with all MMT of left shoulder  Current: carrying laundry and groceries without pain      4.  Pt FOTO score will increase by 14 points to show improvement in function. Eval:55  Current:MET 88/100          PLAN  []  Upgrade activities as tolerated     []  Continue plan of care  []  Update interventions per flow sheet       []  Discharge due to:_  [x]  Other:_ Prep for discharge in 2 visits.      Junaid Quintero, PTA 2/8/2018  10:17 AM    Future Appointments  Date Time Provider Sandra Ferrell   2/13/2018 10:00 AM Bentley Peng PT, DPT [facility]   2/15/2018 10:00 AM Bentley Peng PT, DPT [place] [facility]

## 2018-02-13 ENCOUNTER — HOSPITAL ENCOUNTER (OUTPATIENT)
Dept: PHYSICAL THERAPY | Age: 63
Discharge: HOME | End: 2018-02-13
Payer: COMMERCIAL

## 2018-02-13 PROCEDURE — 97110 THERAPEUTIC EXERCISES: CPT

## 2018-02-13 PROCEDURE — 97530 THERAPEUTIC ACTIVITIES: CPT

## 2018-02-13 NOTE — PROGRESS NOTES
[facility] at the [address], [facility], [address] [facility]  Phone: [phone]      Fax:  [phone]    Physician Update  [x] Progress Note  [x] Discharge Summary    Patient name: Jaz Vo Start of Care: 2018   Referral source: Endy Carter MD : 1955                         Medical Diagnosis: Left shoulder pain [M25.512]  Left elbow pain [M25.522] Onset Date:Spring 2017                         Treatment Diagnosis: Left Shoulder Pain    Prior Hospitalization: see medical history Provider#: [id number]   Medications: Verified on Patient summary List    Comorbidities: BMI >30, HTN, Hx of Cancer   Prior Level of Function: Increase in pain and difficulty with lying on the left side, reaching over head, reaching (as with hugging), pushing (as with standing up and pushing an object), pulling, lifting, dressing, bathing    Visits from Start of Care: 9    Missed Visits: 0    Progress towards Goals:   1.  Pt will able to dress and bathe without pain,including reaching behind back with Left UE. Eval:  AROM/PROM Right Left   IR T8-7 Buttock  In scap plane: 63   ER T2 Occiput  In scap plane: 50   Current: Progressing, IR T10  ER: T1      2.  Pt will be able to reach overhead without pain to increase tolerance to daily activities. Eval:  AROM/PROM Right Left   Shoulder Flex *   Abd WFL 86*   Current: Progressing: Flexion 152*  Abduction 145(      3.  Pt be able to lift bag of groceries and gallon of milk without pain. Eval:pain with all MMT of left shoulder  Current: MET no pain with MMT of shoulder      4.  Pt FOTO score will increase by 14 points to show improvement in function. Eval:55  Current:MET 88/100    ASSESSMENT/RECOMMENDATIONS:  Pt is demonstrating excellent progress with PT. ROM has increased and pt reports no pain with daily activities and has returned to all activities. Is now able to lie on the left side without pain.  Plan to continue x1 additional visit for transiiton to [facility]. Treatment has focussed on scapular stabilization, soft tissue restrictions, joint mobilizations, ROM and strengthening. [x]Continue therapy per initial plan/protocol at a frequency of  1 x per week for 1 weeks  []Continue therapy with the following recommended changes:_____________________      _____________________________________________________________________  []Discontinue therapy progressing towards or have reached established goals  []Discontinue therapy due to lack of appreciable progress towards goals  []Discontinue therapy due to lack of attendance or compliance  []Await Physician's recommendations/decisions regarding therapy  []Other:________________________________________________________________    Thank you for this referral.   Prasad Eddy PT, DPT 2/13/2018 3:24 PM  NOTE TO PHYSICIAN:  PLEASE COMPLETE THE ORDERS BELOW AND   FAX TO [facility]: [phone]  If you are unable to process this request in 24 hours please contact our office: [phone]          []  I have read the above report and request that my patient continue as recommended. []  I have read the above report and request that my patient continue therapy with the following changes/special instructions:________________________________________  []I have read the above report and request that my patient be discharged from therapy.     Physicians signature: ______________________________Date: _____Time:_______

## 2018-02-13 NOTE — PROGRESS NOTES
PT DAILY TREATMENT NOTE     Patient Name: Elvira Feliz  Date:2018  : 1955  [x]  Patient  Verified  Payor: BLUE CROSS / Plan: [id number] PPO / Product Type: PPO /    In time:10:00 am  Out time:11:00 am  Total Treatment Time (min): 60  Visit #: 9 of 12    Treatment Area: Left shoulder pain [M25.512]  Left elbow pain [M25.522]    SUBJECTIVE  Pain Level (0-10 scale): 0  Any medication changes, allergies to medications, adverse drug reactions, diagnosis change, or new procedure performed?: [x] No    [] Yes (see summary sheet for update)  Subjective functional status/changes:   [] No changes reported  \"It is like night and day. I can lie on the left side. It is great. \"    OBJECTIVE    Modality rationale:    Min Type Additional Details    [] Estim:  []Unatt       []IFC  []Premod                        []Other:  []w/ice   []w/heat  Position:  Location:    [] Estim: []Att    []TENS instruct  []NMES                    []Other:  []w/US   []w/ice   []w/heat  Position:  Location:    []  Traction: [] Cervical       []Lumbar                       [] Prone          []Supine                       []Intermittent   []Continuous Lbs:  [] before manual  [] after manual    []  Ultrasound: []Continuous   [] Pulsed                           []1MHz   []3MHz W/cm2:  Location:    []  Iontophoresis with dexamethasone         Location: [] Take home patch   [] In clinic    []  Ice     []  heat  []  Ice massage  []  Laser   []  Anodyne Position:  Location:    []  Laser with stim  []  Other:  Position:  Location:    []  Vasopneumatic Device Pressure:       [] lo [] med [] hi   Temperature: [] lo [] med [] hi   [] Skin assessment post-treatment:  []intact []redness- no adverse reaction    []redness  adverse reaction:     50 min Therapeutic Exercise:  [x] See flow sheet :   Rationale: increase ROM, increase strength, improve coordination and increase proprioception to improve the patients ability to perform daily activities with decreased pain and symptom levels      10 min Therapeutic Activity:  []  See flow sheet :   Rationale: increase ROM, increase strength, improve coordination and increase proprioception  to improve the patients ability to perform daily activities with decreased pain and symptom levels            With   [] TE   [] TA   [] neuro   [] other: Patient Education: [x] Review HEP    [] Progressed/Changed HEP based on:   [] positioning   [] body mechanics   [] transfers   [] heat/ice application    [] other:      Other Objective/Functional Measures:   See goals for objective data     Pain Level (0-10 scale) post treatment: 0    ASSESSMENT/Changes in Function:   Pt is demonstrating excellent progress with PT. ROM has increased and pt reports no pain with daily activities and has returned to all activities. Is now able to lie on the left side without pain. Plan to continue x1 additional visit for transiiton to [facility]. Treatment has focussed on scapular stabilization, soft tissue restrictions, joint mobilizations, ROM and strengthening. Patient will continue to benefit from skilled PT services to modify and progress therapeutic interventions, address functional mobility deficits, address ROM deficits, address strength deficits, analyze and address soft tissue restrictions, analyze and cue movement patterns, analyze and modify body mechanics/ergonomics, assess and modify postural abnormalities and instruct in home and community integration to attain remaining goals. []  See Plan of Care  []  See progress note/recertification  []  See Discharge Summary         Progress towards goals / Updated goals:  Long Term Goals: LTG- To be accomplished in 6 week(s):  1.  Pt will able to dress and bathe without pain,including reaching behind back with Left UE.   Eval:  AROM/PROM Right Left   IR T8-7 Buttock  In scap plane: 63   ER T2 Occiput  In scap plane: 50   Current: Progressing, IR T10  ER: T1      2.  Pt will be able to reach overhead without pain to increase tolerance to daily activities. Eval:  AROM/PROM Right Left   Shoulder Flex *   Abd WFL 86*   Current: Progressing: Flexion 152*  Abduction 145(      3.  Pt be able to lift bag of groceries and gallon of milk without pain. Eval:pain with all MMT of left shoulder  Current: MET no pain with MMT of shoulder      4.  Pt FOTO score will increase by 14 points to show improvement in function.   Eval:55  Current:MET 88/100      PLAN  [x]  Upgrade activities as tolerated     []  Continue plan of care  []  Update interventions per flow sheet       []  Discharge due to:_  []  Other:_      Praveen Delgado PT, DPT 2/13/2018  10:19 AM    Future Appointments  Date Time Provider Sandra Ferrell   2/15/2018 10:00 AM Praveen Delgado PT, DPT [id number]

## 2018-02-15 ENCOUNTER — HOSPITAL ENCOUNTER (OUTPATIENT)
Dept: PHYSICAL THERAPY | Age: 63
Discharge: HOME | End: 2018-02-15
Payer: COMMERCIAL

## 2018-02-15 PROCEDURE — 97110 THERAPEUTIC EXERCISES: CPT

## 2018-02-15 PROCEDURE — 97530 THERAPEUTIC ACTIVITIES: CPT

## 2018-02-15 NOTE — PROGRESS NOTES
PT DISCHARGE DAILY NOTE AND [id number]    Date:2/15/2018  Patient name: Arloa Fleischer Start of Care: 2018   Referral source: Carla Thornton MD : 1955                         Medical Diagnosis: Left shoulder pain [M25.512]  Left elbow pain [M25.522] Onset Date:Spring 2017                         Treatment Diagnosis: Left Shoulder Pain    Prior Hospitalization: see medical history Provider#: [id number]   Medications: Verified on Patient summary List    Comorbidities: BMI >30, HTN, Hx of Cancer   Prior Level of Function: Increase in pain and difficulty with lying on the left side, reaching over head, reaching (as with hugging), pushing (as with standing up and pushing an object), pulling, lifting, dressing, bathing    Visits from Start of Care: 10    Missed Visits: 0    Reporting Period : 18 to 2/15/18    [x]  Patient  Verified  Payor: BLUE CROSS / Plan: [facility] PPO / Product Type: PPO /    In time:10:05 am  Out time:11:13 am   Total Treatment Time (min): 68  Visit #: 10 of 11    SUBJECTIVE  Pain Level (0-10 scale): 0  Any medication changes, allergies to medications, adverse drug reactions, diagnosis change, or new procedure performed?: [x] No    [] Yes (see summary sheet for update)  Subjective functional status/changes:   [] No changes reported  \"I feel great. \"    OBJECTIVE    Modality rationale:    Min Type Additional Details    [] Estim:  []Unatt       []IFC  []Premod                        []Other:  []w/ice   []w/heat  Position:  Location:    [] Estim: []Att    []TENS instruct  []NMES                    []Other:  []w/US   []w/ice   []w/heat  Position:  Location:    []  Traction: [] Cervical       []Lumbar                       [] Prone          []Supine                       []Intermittent   []Continuous Lbs:  [] before manual  [] after manual    []  Ultrasound: []Continuous   [] Pulsed                           []1MHz   []3MHz W/cm2:  Location:    []  Iontophoresis with dexamethasone         Location: [] Take home patch   [] In clinic    []  Ice     []  heat  []  Ice massage  []  Laser   []  Anodyne Position:  Location:    []  Laser with stim  []  Other:  Position:  Location:    []  Vasopneumatic Device Pressure:       [] lo [] med [] hi   Temperature: [] lo [] med [] hi   [] Skin assessment post-treatment:  []intact []redness- no adverse reaction    []redness  adverse reaction:     40 min Therapeutic Exercise:  [x] See flow sheet :   Rationale: increase ROM, increase strength, improve coordination and increase proprioception to improve the patients ability to perform daily activities with decreased pain and symptom levels      23 min Therapeutic Activity:  [x]  See flow sheet :   Rationale: increase ROM, increase strength, improve coordination and increase proprioception  to improve the patients ability to perform daily activities with decreased pain and symptom levels          With   [x] TE   [x] TA   [] neuro   [] other: Patient Education: [x] Review HEP    [] Progressed/Changed HEP based on:   [] positioning   [] body mechanics   [] transfers   [] heat/ice application    [] other:      Other Objective/Functional Measures:   See goals     Pain Level (0-10 scale) post treatment: 0    Summary of Care:  1.  Pt will able to dress and bathe without pain,including reaching behind back with Left UE. Eval:  AROM/PROM Right Left   IR T8-7 Buttock  In scap plane: 63   ER T2 Occiput  In scap plane: 50   Current: Partially MET, IR T10  ER: T1      2.  Pt will be able to reach overhead without pain to increase tolerance to daily activities. Eval:  AROM/PROM Right Left   Shoulder Flex *   Abd WFL 86*   Current: MET: Flexion 152*  Abduction 145      3.  Pt be able to lift bag of groceries and gallon of milk without pain.   Eval:pain with all MMT of left shoulder  Current: MET no pain with MMT of shoulder      4.  Pt FOTO score will increase by 14 points to show improvement in function. Eval:55  Current:MET 88/100    ASSESSMENT/Changes in Function:   Pt is demonstrating excellent progress with PT. ROM has increased and pt reports no pain with daily activities and has returned to all activities. Is now able to lie on the left side without pain. PTreatment has focussed on scapular stabilization, soft tissue restrictions, joint mobilizations, ROM and strengthening.  Pt is compliant with HEP, updated this visit and return demonstrated understanding    Thank you for this referral!      PLAN  [x]Discontinue therapy: [x]Patient has reached or is progressing toward set goals      []Patient is non-compliant or has abdicated      []Due to lack of appreciable progress towards set goals    Janae Boateng, PT, DPT 2/15/2018  10:17 AM